# Patient Record
(demographics unavailable — no encounter records)

---

## 2021-10-27 NOTE — PHYS DOC
Past Medical History


Past Medical History:  Anxiety, Bipolar, Depression


Additional Past Medical Histor:  Borderline personality disorder





General Adult


EDM:


Chief Complaint:  MEDICATION REFILL





HPI:


HPI:





Patient is a 29 year old female with history of anxiety, depression, bipolar 

disorder, borderline personality disorder who presents for medication refill.  

Patient states that she is currently unemployed and has not had money to see her

psychiatrist or refill her prescriptions.  She states she has been out of her 

medications for about a month.  She reports she takes olanzapine 10 mg and 

trazodone 20 mg daily.  She reports feeling anxious.  Patient denies SI and HI. 

Patient has no other complaints at this time.





Review of Systems:


Review of Systems:


ROS negative except as mentioned in HPI.





Heart Score:


C/O Chest Pain:  No





Allergies:


Allergies:





Allergies








Coded Allergies Type Severity Reaction Last Updated Verified


 


  Penicillins Allergy Intermediate  10/26/21 Yes











Physical Exam:


PE:





Constitutional: Well developed, well nourished, well groomed non-toxic 

appearance. 


Cardiovascular: Heart rate regular rhythm, no murmur.


Lungs & Thorax: Bilateral breath sounds clear to auscultation.


Neurologic: Alert and oriented x3, normal motor function, normal sensory 

function, no focal deficits noted.


Psychologic: Affect anxious and fidgeting with O2 probe, fair judgment, mood 

"anxious."





Current Patient Data:


Vital Signs:





Vital Signs








  Date Time  Temp Pulse Resp B/P (MAP) Pulse Ox O2 Delivery O2 Flow Rate FiO2


 


10/26/21 23:35 98.0 100 20 144/93 (110) 99 Room Air  





 98.0       











Repeat blood pressure was 150/87.





Course & Med Decision Making:


Course & Med Decision Making


Pertinent Labs and Imaging studies reviewed. (See chart for details)





Patient's blood pressure was elevated on arrival.  After she has been in the 

department, it has come down slightly.  The persistent elevated systolic blood 

pressure is likely secondary to anxiety.





Patient has a longstanding history of anxiety, depression and other psychiatric 

diagnoses.  Patient appears anxious on exam, however does not seem to be a 

danger to herself or others.  I will give her 1 dose of the olanzapine and 

trazodone here in the department, as many pharmacies are closed right now.  She 

does have financial concerns regarding filling the prescription, but good Rx has

prices she can manage at [facility].  Patient states that her 

grandmother will be able to help her pay for the prescriptions.  Patient 

understands and is agreeable to discharge plan.





Dragon Disclaimer:


Dragon Disclaimer:


This electronic medical record was generated, in whole or in part, using a voice

recognition dictation system.





Departure


Departure


Impression:  


   Primary Impression:  


   Medication refill


   Additional Impressions:  


   Anxiety


   Depression


   Qualified Codes:  F32.A - Depression, unspecified


   Hx of bipolar disorder


   Hx of borderline personality disorder


Disposition:  01 HOME / SELF CARE / HOMELESS


Condition:  STABLE


Referrals:  


NO PCP (PCP)


Patient Instructions:  Medication Refill, Emergency Department





Additional Instructions:  


Prescriptions were sent to [facility] pharmacy, as discussed.  Please return to the

emergency department if you develop any new symptoms.


Scripts


Trazodone Hcl (TRAZODONE HCL) 50 Mg Tablet


0.5 TAB PO DAILY for 30 Days, #15 TAB


   Prov: TAMIKO ACOSTA         10/27/21 


Olanzapine (OLANZAPINE) 10 Mg Tablet


10 MG PO DAILY for 30 Days, #30 TAB


   Prov: TAMIKO ACOSTA         10/27/21











TAMIKO ACOSTA              Oct 27, 2021 00:03